# Patient Record
(demographics unavailable — no encounter records)

---

## 2018-02-08 NOTE — RAD
PORTABLE CHEST:

 

History: Bradycardia and dizziness. 

 

FINDINGS: 

Heart size is within normal limits for portable technique. There are atherosclerotic changes of the a
[place]. Lungs are clear of any infiltrative process. 

 

IMPRESSION: 

Mild chronic lung change. Borderline heart size. 

 

POS: [facility]

## 2018-02-08 NOTE — EKG
Test Reason : POST PACEMAKER INSER

Blood Pressure : ***/*** mmHG

Vent. Rate : 082 BPM     Atrial Rate : 079 BPM

   P-R Int : 088 ms          QRS Dur : 186 ms

    QT Int : 480 ms       P-R-T Axes : 000 -64 105 degrees

   QTc Int : 560 ms

 

AV sequential or dual chamber electronic pacemaker

When compared with ECG of 02-FEB-2018 20:33, (Unconfirmed)

Electronic ventricular pacemaker has replaced Sinus rhythm

Confirmed by TIFFANY MARIE ([id number]) on 2/8/2018 9:50:13 PM

 

Referred By:  GEOVANNY           Confirmed By:TIFFANY MARIE

## 2018-02-08 NOTE — HP
DATE OF ADMISSION:  02/08/2018

 

INDICATION FOR ADMISSION:  An 81-year-old female with complete AV heart block with a heart rate in th
e 30s.

 

HISTORY OF PRESENT ILLNESS:  This very pleasant 81-year-old female who was seen in her primary care p
marianna's office today after having a recent urinary tract infection for which she was seen in the e
mergency room last week.  She had been on antibiotics and then she was noted to have bradycardia, was
 sent to the emergency room.  EKG shows a third degree AV heart block with heart rates in the 30s.  S
he does not have any complaints at this time.  She has been complaining of fatigue recently, but no c
hest pain or any other significant problems.  She does not have a history of hypertension, but does h
ave a history of hypercholesterolemia and diabetes, no tobacco abuse.

 

PAST MEDICAL HISTORY:  Significant for a bilateral total knee replacements, hysterectomy, and diabete
s for the last 20-25 years and history of Graves disease.

 

SOCIAL HISTORY:  She is a .  She has 1 child with no heart disease except he has had some fast h
eart rates in the past.  I believe I have seen him also in the office.  She has no history of alcohol
 or tobacco abuse.  She is retired.

 

FAMILY HISTORY:  Unremarkable except for what is noted in the history her son.

 

ALLERGIES:  None.

 

MEDICATIONS:  Synthroid, metformin, aspirin, Lyrica and simvastatin.

 

REVIEW OF SYSTEMS:  A 12 point review of systems is unremarkable except what was noted in the history
 of present illness.  She wears glasses.  She has a chronic cough.  She has diabetic neuropathy and a
rthritis.

 

PHYSICAL EXAMINATION:

GENERAL:  Reveals a well-developed, well-nourished, elderly female.

VITAL SIGNS:  Blood pressure 130/74, heart rate 35 and shows complete heart block, respiratory rate 1
6.  She is afebrile.

HEENT:  Shows the head to be normocephalic, atraumatic.  Carotid pulses are present.  There are no br
uits, no JVD.

CHEST:  Clear to auscultation without rales, rhonchi or wheezing.

CARDIOVASCULAR:  Exam reveals a severe bradycardia.  I cannot hear any significant murmurs, heaves, t
hrills, bruits or rubs.

ABDOMEN:  Shows obesity with positive bowel sounds.  No organomegaly or masses are noted.  Femoral pu
lses are present.

EXTREMITIES:  Showed no clubbing, cyanosis or edema.  Pedal pulses are present.

NEUROLOGIC:  The patient remains intact.

SKIN:  Warm and dry.

 

EKG shows third degree heart block as noted above with a heart rate in the 30s.

 

IMPRESSION:

1.  A third degree heart block for which she will undergo urgent pacemaker implantation.  I have disc
ussed with her and explained the procedure and the risks to include bleeding, infection, possibility 
of pneumothorax, hemothorax or pericardial tamponade even the possibility of death and infection.  Griselda morgan understands and agrees to proceed.

2.  Hypercholesterolemia.  We will continue on her simvastatin

3.  Diabetes.  She will continue on those medications with her metformin.

4.  History of diabetic neuropathy for which she takes Lyrica.  This will be filled by the primary ca
re physician.  

5.  Hypothyroidism after having an episode of Graves' disease many years ago.  She will continue on h
er Synthroid once we know the normal doses of her medications.  

 

If she remains stable, there is a possibility after the pacemaker insertion she could be discharged l
ater today or she could most likely spent the night and go home tomorrow.

## 2018-02-08 NOTE — RAD
PORTABLE CHEST:

2/8/18

 

HISTORY: 

Post pacemaker placement.

 

Comparison made to film earlier today. 

 

FINDINGS/IMPRESSION:  

Dual lead pacemaker is in place via the left subclavian vein. Leads appear in adequate position. No p
neumothorax or other acute lung process identified. No significant interval change. 

 

POS: [facility]

## 2018-02-09 NOTE — PDOC.CTH
<Annemarie Perez - Last Filed: 02/09/18 08:32>





Cardiology Progress Note





- Subjective





The pt seen and examined.  No overnight events.  No cardiac complaints.  She 

stated she has a sling at home and would like to use it at night to prevent 

raising her Lt arm above her shoulder.  She lives alone at home.





- Objective


 Vital Signs











  Temp Pulse Resp BP Pulse Ox


 


 02/09/18 07:54  98.1 F  79  16  138/74  99


 


 02/09/18 03:47  98.2 F  81  18  128/61  99


 


 02/09/18 00:00  97.9 F  81  14  133/62  98








 











Weight                         199 lb 14.4 oz














 











 02/08/18 02/09/18 02/10/18





 06:59 06:59 06:59


 


Intake Total  600 


 


Balance  600 














- Physical Examination


General/Neuro: alert & oriented x3


Neck: no JVD present


Lungs: CTA


Heart: RRR


Abdomen: soft


Extremities: other: (No edema)





- Telemetry


Telemetry Rhythm: AV paced





- Labs


Result Diagrams: 


 02/08/18 12:35





 02/08/18 12:35


 Troponin/CKMB











CK-MB (CK-2)  1.5 ng/mL (0-6.6)   02/08/18  12:35    


 


Troponin I  0.022 ng/mL (< 0.028)   02/08/18  12:35    














- Assessment/Plan





1. S/p Dual PM placement on 02/08/18 secondary to complete AVB - stable; no 

pnuemothrax on CXR. Mild hematoma with no swelling or drainage.  She denied any 

pain at the site. 


2. Hyperlipidemia - on statin


3. DM type 2 - stable


4. Hypothyroidism - on Levothyroxin; 


MAR reviewed





* The pt is stable to d/c home.  [employer] will call the pt within 2 days for 

Carelink device.  


* The pt will f/u with nurse @ Dr Linton' office within 7-10 days for the PM site 

check


* PM education given to the pt





Review of Systems





- Review of Systems


Constitutional: reports: no symptoms reported


EENTM: reports: no symptoms reported


Respiratory: reports: no symptoms reported


Cardiac (ROS): reports: no symptoms reported


ABD/GI: reports: no symptoms reported


: reports: no symptoms reported


Musculoskeletal: reports: no symptoms reported





<SUSIE Linton - Last Filed: 02/09/18 09:48>





Cardiology Progress Note





- Objective


 Vital Signs











  Temp Pulse Resp BP Pulse Ox


 


 02/09/18 08:00  98.1 F  79  16   99


 


 02/09/18 07:54  98.1 F  79  16  138/74  99


 


 02/09/18 03:47  98.2 F  81  18  128/61  99


 


 02/09/18 00:00  97.9 F  81  14  133/62  98








 











Weight                         199 lb 14.4 oz














 











 02/08/18 02/09/18 02/10/18





 06:59 06:59 06:59


 


Intake Total  600 


 


Balance  600 














- Labs


Result Diagrams: 


 02/08/18 12:35





 02/08/18 12:35


 Troponin/CKMB











CK-MB (CK-2)  1.5 ng/mL (0-6.6)   02/08/18  12:35    


 


Troponin I  0.022 ng/mL (< 0.028)   02/08/18  12:35    














- Assessment/Plan





pt. seen and eval. by me. i agree with the A/P by the NP. She is pacing 100% in 

the A and V. chest clear,RRR. I will see her in a week in the office,

## 2018-02-09 NOTE — DIS
ADMITTING DIAGNOSES:  Complete third-degree arteriovenous heart block which was required.  Otherwise,
 she has a history also of a recent urinary tract infection, history of hypertension.

 

DISCHARGE DIAGNOSES:  Complete third-degree arteriovenous heart block which was required.  Otherwise,
 she has a history also of a recent urinary tract infection, history of hypertension.

 

PROCEDURES IN HOSPITAL:  Included dual-chamber pacemaker insertion.

 

DISCHARGE MEDICATIONS:  Tylenol Extra Strength 1-2 tablets q.6 h. p.r.n. for pain, aspirin 81 mg a da
y, Lasix 20 mg p.o. q.a.m., levothyroxine 175 mcg every day, metformin 500 mg b.i.d. Pyridium 195 mg 
3 times daily, Lyrica 75 mg p.o. daily, simvastatin 40 mg at bedtime, Aldactone 25 mg p.o. daily p.r.
n. as needed.

 

FOLLOW UP:  To follow up with me in the office in the next 7-10 days for wound check.  She will recei
ve information on equipment by mail to check her pacemaker over the telephone.  She will continue her
 routine followups with Dr. Torres.

 

HOSPITAL COURSE:  This very pleasant, elderly lady presented to her primary care physician's office w
ith severe bradycardia.  She was advised to go to the emergency room, she actually drove herself to [facility].  When she arrived, she had heart rates into the 30s with complete AV heart block.  She w
as advised to undergo urgent pacemaker insertion.  This was performed without difficulties or complic
ations.  There was no pneumothorax or hemothorax noted on the chest x-ray.  She has been stable.  She
 is pacing 100% after the procedure, both in the atrium and ventricle and has had no further complica
tions.  She is ready for discharge today and I will see her back in the office as noted above to cont
inue to monitor the pacemaker which will also be monitored transtelephonically.  There was no complic
ations or difficulties throughout her hospital stay.

## 2018-02-11 NOTE — EKG
Test Reason : 

Blood Pressure : ***/*** mmHG

Vent. Rate : 080 BPM     Atrial Rate : 080 BPM

   P-R Int : 200 ms          QRS Dur : 180 ms

    QT Int : 478 ms       P-R-T Axes : 066 -63 102 degrees

   QTc Int : 551 ms

 

AV sequential or dual chamber electronic pacemaker

When compared with ECG of 08-FEB-2018 15:49,

Vent. rate has decreased BY   2 BPM

Confirmed by TIFFANY MARIE ([id number]) on 2/11/2018 9:23:43 AM

 

Referred By:  GEOVANNY           Confirmed By:TIFFANY MARIE

## 2018-03-10 NOTE — EKG
Test Reason : ER NDICATION

Blood Pressure : ***/*** mmHG

Vent. Rate : 034 BPM     Atrial Rate : 038 BPM

   P-R Int : 000 ms          QRS Dur : 084 ms

    QT Int : 504 ms       P-R-T Axes : 052 028 102 degrees

   QTc Int : 378 ms

 

Complete Heart Block

Abnormal ECG

 

Confirmed by GOLDIE ALCAZAR MD ([id number]),  MILDRED DIOR ([id number]) on 3/10/2018 5:45:20 PM

 

Referred By:             Confirmed By:GOLDIE ALCAZAR MD

## 2018-04-12 NOTE — RAD
TWO VIEWS OF THE CHEST:

 

COMPARISON: 

2/8/18.

 

HISTORY: 

Dyspnea.

 

FINDINGS: 

Two views of the chest show a normal-size cardiomediastinal silhouette.  The pacemaker is unchanged i
n position.  There is no evidence of consolidation, mass, or pleural effusion.  Degenerative changes 
are seen in the spine.

 

IMPRESSION: 

No evidence of acute cardiopulmonary disease.

 

POS: [facility]

## 2018-04-17 NOTE — RAD
CHEST 1 VIEW:

 

Date:  04/17/18 

 

HISTORY:  

Body aches, chest pain. 

 

COMPARISON:  

Chest 2 views dated 04/12/18. 

 

FINDINGS:

Dual lead pacer is present. Mild blunting left lateral costophrenic sulcus. No pneumothorax. Lungs ar
e slightly hyperinflated. 

 

IMPRESSION: 

Mild blunting left lateral costophrenic sulcus may reflect sequelae of small effusion or atelectasis.
 

 

 

POS: [facility]

## 2018-05-26 NOTE — EKG
Test Reason : 

Blood Pressure : ***/*** mmHG

Vent. Rate : 085 BPM     Atrial Rate : 085 BPM

   P-R Int : 146 ms          QRS Dur : 178 ms

    QT Int : 444 ms       P-R-T Axes : 000 -58 101 degrees

   QTc Int : 528 ms

 

AV dual-paced rhythm

No STEMI

Abnormal ECG

 

Confirmed by MELISSA LEAHY D.O. ([id number]),  MILDRED DIOR ([id number]) on 5/26/2018 10:22:20 PM

 

Referred By:             Confirmed By:MELISSA LEAHY D.O.